# Patient Record
Sex: MALE | Race: BLACK OR AFRICAN AMERICAN | HISPANIC OR LATINO | URBAN - METROPOLITAN AREA
[De-identification: names, ages, dates, MRNs, and addresses within clinical notes are randomized per-mention and may not be internally consistent; named-entity substitution may affect disease eponyms.]

---

## 2018-12-31 ENCOUNTER — EMERGENCY (EMERGENCY)
Facility: HOSPITAL | Age: 83
LOS: 0 days | Discharge: HOME | End: 2018-12-31
Attending: EMERGENCY MEDICINE
Payer: MEDICARE

## 2018-12-31 VITALS
DIASTOLIC BLOOD PRESSURE: 84 MMHG | RESPIRATION RATE: 15 BRPM | OXYGEN SATURATION: 96 % | HEIGHT: 70 IN | SYSTOLIC BLOOD PRESSURE: 129 MMHG | WEIGHT: 240.08 LBS | TEMPERATURE: 98 F | HEART RATE: 72 BPM

## 2018-12-31 VITALS
HEART RATE: 74 BPM | RESPIRATION RATE: 16 BRPM | DIASTOLIC BLOOD PRESSURE: 88 MMHG | SYSTOLIC BLOOD PRESSURE: 147 MMHG | TEMPERATURE: 98 F | OXYGEN SATURATION: 97 %
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
## 2018-12-31 DIAGNOSIS — T78.3XXA ANGIONEUROTIC EDEMA, INITIAL ENCOUNTER: ICD-10-CM

## 2018-12-31 DIAGNOSIS — T78.40XA ALLERGY, UNSPECIFIED, INITIAL ENCOUNTER: ICD-10-CM
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
## 2018-12-31 DIAGNOSIS — I10 ESSENTIAL (PRIMARY) HYPERTENSION: ICD-10-CM

## 2018-12-31 LAB
ALBUMIN SERPL ELPH-MCNC: 3.6 G/DL — SIGNIFICANT CHANGE UP (ref 3.3–5)
ALP SERPL-CCNC: 72 U/L — SIGNIFICANT CHANGE UP (ref 40–120)
ALT FLD-CCNC: 50 U/L — SIGNIFICANT CHANGE UP (ref 12–78)
ANION GAP SERPL CALC-SCNC: 7 MMOL/L — SIGNIFICANT CHANGE UP (ref 5–17)
APTT BLD: 29.8 SEC — SIGNIFICANT CHANGE UP (ref 27.5–36.3)
AST SERPL-CCNC: 59 U/L — HIGH (ref 15–37)
BASOPHILS # BLD AUTO: 0.02 K/UL — SIGNIFICANT CHANGE UP (ref 0–0.2)
BASOPHILS NFR BLD AUTO: 0.3 % — SIGNIFICANT CHANGE UP (ref 0–2)
BILIRUB SERPL-MCNC: 1.4 MG/DL — HIGH (ref 0.2–1.2)
BLD GP AB SCN SERPL QL: SIGNIFICANT CHANGE UP
BUN SERPL-MCNC: 24 MG/DL — HIGH (ref 7–23)
CALCIUM SERPL-MCNC: 9.1 MG/DL — SIGNIFICANT CHANGE UP (ref 8.5–10.1)
CHLORIDE SERPL-SCNC: 102 MMOL/L — SIGNIFICANT CHANGE UP (ref 96–108)
CO2 SERPL-SCNC: 31 MMOL/L — SIGNIFICANT CHANGE UP (ref 22–31)
CREAT SERPL-MCNC: 1.62 MG/DL — HIGH (ref 0.5–1.3)
EOSINOPHIL # BLD AUTO: 0.3 K/UL — SIGNIFICANT CHANGE UP (ref 0–0.5)
EOSINOPHIL NFR BLD AUTO: 4.5 % — SIGNIFICANT CHANGE UP (ref 0–6)
GLUCOSE SERPL-MCNC: 104 MG/DL — HIGH (ref 70–99)
HCT VFR BLD CALC: 48.5 % — SIGNIFICANT CHANGE UP (ref 39–50)
HGB BLD-MCNC: 16.1 G/DL — SIGNIFICANT CHANGE UP (ref 13–17)
IMM GRANULOCYTES NFR BLD AUTO: 0.1 % — SIGNIFICANT CHANGE UP (ref 0–1.5)
INR BLD: 1.14 RATIO — SIGNIFICANT CHANGE UP (ref 0.88–1.16)
LYMPHOCYTES # BLD AUTO: 1.82 K/UL — SIGNIFICANT CHANGE UP (ref 1–3.3)
LYMPHOCYTES # BLD AUTO: 27.1 % — SIGNIFICANT CHANGE UP (ref 13–44)
MCHC RBC-ENTMCNC: 28.6 PG — SIGNIFICANT CHANGE UP (ref 27–34)
MCHC RBC-ENTMCNC: 33.2 GM/DL — SIGNIFICANT CHANGE UP (ref 32–36)
MCV RBC AUTO: 86.1 FL — SIGNIFICANT CHANGE UP (ref 80–100)
MONOCYTES # BLD AUTO: 0.56 K/UL — SIGNIFICANT CHANGE UP (ref 0–0.9)
MONOCYTES NFR BLD AUTO: 8.3 % — SIGNIFICANT CHANGE UP (ref 2–14)
NEUTROPHILS # BLD AUTO: 4.01 K/UL — SIGNIFICANT CHANGE UP (ref 1.8–7.4)
NEUTROPHILS NFR BLD AUTO: 59.7 % — SIGNIFICANT CHANGE UP (ref 43–77)
NRBC # BLD: 0 /100 WBCS — SIGNIFICANT CHANGE UP (ref 0–0)
PLATELET # BLD AUTO: 166 K/UL — SIGNIFICANT CHANGE UP (ref 150–400)
POTASSIUM SERPL-MCNC: 3.8 MMOL/L — SIGNIFICANT CHANGE UP (ref 3.5–5.3)
POTASSIUM SERPL-SCNC: 3.8 MMOL/L — SIGNIFICANT CHANGE UP (ref 3.5–5.3)
PROT SERPL-MCNC: 7.9 GM/DL — SIGNIFICANT CHANGE UP (ref 6–8.3)
PROTHROM AB SERPL-ACNC: 12.8 SEC — SIGNIFICANT CHANGE UP (ref 10–12.9)
RBC # BLD: 5.63 M/UL — SIGNIFICANT CHANGE UP (ref 4.2–5.8)
RBC # FLD: 15.3 % — HIGH (ref 10.3–14.5)
SODIUM SERPL-SCNC: 140 MMOL/L — SIGNIFICANT CHANGE UP (ref 135–145)
WBC # BLD: 6.72 K/UL — SIGNIFICANT CHANGE UP (ref 3.8–10.5)
WBC # FLD AUTO: 6.72 K/UL — SIGNIFICANT CHANGE UP (ref 3.8–10.5)

## 2018-12-31 PROCEDURE — 99284 EMERGENCY DEPT VISIT MOD MDM: CPT

## 2018-12-31 RX ORDER — ALBUTEROL 90 UG/1
2 AEROSOL, METERED ORAL
Qty: 0 | Refills: 0 | COMMUNITY

## 2018-12-31 RX ORDER — FAMOTIDINE 10 MG/ML
20 INJECTION INTRAVENOUS ONCE
Qty: 0 | Refills: 0 | Status: COMPLETED | OUTPATIENT
Start: 2018-12-31 | End: 2018-12-31

## 2018-12-31 RX ORDER — DIPHENHYDRAMINE HCL 50 MG
50 CAPSULE ORAL ONCE
Qty: 0 | Refills: 0 | Status: COMPLETED | OUTPATIENT
Start: 2018-12-31 | End: 2018-12-31

## 2018-12-31 RX ADMIN — Medication 125 MILLIGRAM(S): at 15:51

## 2018-12-31 RX ADMIN — FAMOTIDINE 20 MILLIGRAM(S): 10 INJECTION INTRAVENOUS at 15:43

## 2018-12-31 RX ADMIN — Medication 50 MILLIGRAM(S): at 15:40

## 2018-12-31 NOTE — ED PROVIDER NOTE - MEDICAL DECISION MAKING DETAILS
labs, pepcid, benadryl, solumedrol, continue close monitoring. given symptoms have last since am and not worsening will continue monitoring.

## 2018-12-31 NOTE — ED ADULT NURSE NOTE - OBJECTIVE STATEMENT
83 year old male to the ed with family for evaluation of tongue swelling onset about 6:30 am. He had neck stiffness and pain so he took Aleve and had grapes. He has had both in the past no reaction. He felt his tongue swelling. He took Benadryl and it went down. but the swelling is still present.

## 2018-12-31 NOTE — ED PROVIDER NOTE - PHYSICAL EXAMINATION
Gen: Alert, Well appearing. NAD    Head: NC, AT, PERRL, EOMI, normal lids/conjunctiva   ENT: Bilateral TM WNL, normal hearing, patent oropharynx without erythema/exudate, uvula midline and no edematous, + no lip edema, no trismus. mainintaing secreations and no distress  Neck: supple, no tenderness/meningismus/JVD   Pulm: Bilateral clear BS, normal resp effort, no wheeze/stridor/retractions  CV: RRR, no M/R/G, +dist pulses   Abd: soft, NT/ND, +BS, no guarding/rebound tenderness  Mskel: no edema/erythema/cyanosis   Skin: no rash   Neuro: AAOx3, no sensory/motor deficits, CN 2-12 intact

## 2018-12-31 NOTE — ED PROVIDER NOTE - NSFOLLOWUPINSTRUCTIONS_ED_ALL_ED_FT
Surgery still not set up will repopulated tomorrow to check tomorrow   Take steroids as prescribed. Stop lisinopril and follow up closely with your doctor to switch blood pressure medication. Return for any swelling/difficulty breathing/swallowing.

## 2018-12-31 NOTE — ED PROVIDER NOTE - PROGRESS NOTE DETAILS
tongue swelling improved but still large. pt refuse to be admitted, will continue monitoring Hagan: tongue swelling much better per family/day doctor, pt breathing comfortable, can visualize posterior pharynx fairly easily. instructed to stop lisinopril and f/u closely with primary doctor for different bp med. return precautions, short course steroids/benadryl as needed. stable for d/c.

## 2018-12-31 NOTE — ED ADULT NURSE NOTE - CHPI ED NUR SYMPTOMS NEG
no congestion/no difficulty breathing/no nausea/no rash/no shortness of breath/no throat itching/no vomiting/no wheezing

## 2018-12-31 NOTE — ED ADULT NURSE NOTE - CHIEF COMPLAINT QUOTE
stiff neck x 4 days, took aleve x2 tablets and then ate some grapes, now c/o swollen tongue and difficulty swallowing and speaking. Took benadryl 2 tablets at 0930

## 2018-12-31 NOTE — ED PROVIDER NOTE - OBJECTIVE STATEMENT
84yo male with pmh HTN on lisinopril, gout presents with tongue edema since 6am. Took benadryl 50mg and states slightly improved. Pt reports it happened ~ 15 min after taking aleve and eating grapes, both of which he's had before. Pt has been having some mild neck pain past few days. no sob. no lip swelling.    ROS: No fever/chills. No photophobia/eye pain/changes in vision, No ear pain/sore throat/dysphagia, No chest pain/palpitations. No SOB/cough/stridor. No abdominal pain, N/V/D, no black/bloody bm. No dysuria/frequency/discharge, No headache. No Dizziness.  No rash.  No numbness/tingling/weakness. 82yo male with pmh HTN on lisinopril, gout presents with tongue edema since 6am. Took benadryl 50mg and states slightly improved. Pt reports it happened ~ 15 min after taking aleve and eating grapes, both of which he's had before. Pt has been having some mild neck pain past few days. no sob. no lip swelling. states this has happened once before    ROS: No fever/chills. No photophobia/eye pain/changes in vision, No ear pain/sore throat/dysphagia, No chest pain/palpitations. No SOB/cough/stridor. No abdominal pain, N/V/D, no black/bloody bm. No dysuria/frequency/discharge, No headache. No Dizziness.  No rash.  No numbness/tingling/weakness.

## 2020-11-08 NOTE — ED ADULT TRIAGE NOTE - BMI (KG/M2)
Patient states she's been dealing with gout to both feet. Radha Del Rosario Wednesday night when getting up from the toilet, landing on both knees. C/o pain to feet, knees and low back.
34.4

## 2023-08-29 DIAGNOSIS — M10.9 GOUT, UNSPECIFIED: ICD-10-CM

## 2023-08-29 DIAGNOSIS — E78.49 OTHER HYPERLIPIDEMIA: ICD-10-CM

## 2023-08-29 DIAGNOSIS — R07.89 OTHER CHEST PAIN: ICD-10-CM

## 2023-08-29 DIAGNOSIS — Z87.891 PERSONAL HISTORY OF NICOTINE DEPENDENCE: ICD-10-CM

## 2023-08-29 DIAGNOSIS — Z86.73 PERSONAL HISTORY OF TRANSIENT ISCHEMIC ATTACK (TIA), AND CEREBRAL INFARCTION W/OUT RESIDUAL DEFICITS: ICD-10-CM

## 2023-08-29 PROBLEM — Z00.00 ENCOUNTER FOR PREVENTIVE HEALTH EXAMINATION: Status: ACTIVE | Noted: 2023-08-29

## 2023-08-29 RX ORDER — TAMSULOSIN HYDROCHLORIDE 0.4 MG/1
0.4 CAPSULE ORAL
Qty: 90 | Refills: 3 | Status: ACTIVE | COMMUNITY

## 2023-08-29 RX ORDER — MULTIVITAMIN
TABLET ORAL
Refills: 0 | Status: ACTIVE | COMMUNITY

## 2023-08-29 RX ORDER — ASPIRIN ENTERIC COATED TABLETS 81 MG 81 MG/1
81 TABLET, DELAYED RELEASE ORAL DAILY
Refills: 0 | Status: ACTIVE | COMMUNITY

## 2023-08-29 RX ORDER — ALLOPURINOL 100 MG/1
100 TABLET ORAL DAILY
Refills: 0 | Status: ACTIVE | COMMUNITY

## 2023-08-29 RX ORDER — MAG HYDROX/ALUMINUM HYD/SIMETH 400-400-40
SUSPENSION, ORAL (FINAL DOSE FORM) ORAL
Refills: 0 | Status: ACTIVE | COMMUNITY

## 2023-08-29 RX ORDER — SIMVASTATIN 10 MG/1
10 TABLET, FILM COATED ORAL DAILY
Refills: 0 | Status: ACTIVE | COMMUNITY

## 2023-08-31 ENCOUNTER — NON-APPOINTMENT (OUTPATIENT)
Age: 88
End: 2023-08-31

## 2023-08-31 ENCOUNTER — APPOINTMENT (OUTPATIENT)
Dept: CARDIOLOGY | Facility: CLINIC | Age: 88
End: 2023-08-31
Payer: MEDICARE

## 2023-08-31 VITALS
DIASTOLIC BLOOD PRESSURE: 113 MMHG | BODY MASS INDEX: 34.16 KG/M2 | HEART RATE: 114 BPM | SYSTOLIC BLOOD PRESSURE: 159 MMHG | WEIGHT: 244 LBS | OXYGEN SATURATION: 94 % | HEIGHT: 71 IN

## 2023-08-31 DIAGNOSIS — R06.02 SHORTNESS OF BREATH: ICD-10-CM

## 2023-08-31 PROCEDURE — 99204 OFFICE O/P NEW MOD 45 MIN: CPT

## 2023-08-31 PROCEDURE — 93000 ELECTROCARDIOGRAM COMPLETE: CPT

## 2023-08-31 NOTE — DISCUSSION/SUMMARY
[FreeTextEntry1] : 87 year old man with history of HTN HLD and CVA (2000) who comes in for evaluation of palpitations and shortness of breath. He states the dyspnea has been going on for years but now he experiences central chest pressure. His palpitations are that his heart feels like it is pounding. #HTN- He checks his BP each morning and /70 On Amlodipine and Atenolol/Losartan #Chest pain/Dyspnea- check Pharm nuc stress test -patient walks with a cane) /TTE Continue ASA and atorva for now #FU in 3 months [EKG obtained to assist in diagnosis and management of assessed problem(s)] : EKG obtained to assist in diagnosis and management of assessed problem(s)

## 2023-08-31 NOTE — HISTORY OF PRESENT ILLNESS
[FreeTextEntry1] : 87 year old man with history of HTN HLD and CVA (2000) who comes in for evaluation of palpitations and shortness of breath. He states the dyspnea has been going on for years but now he experiences central chest pressure. His palpitations are that his heart feels like it is pounding. His EKG shows a prolonged 1AVB

## 2023-09-26 ENCOUNTER — OUTPATIENT (OUTPATIENT)
Dept: OUTPATIENT SERVICES | Facility: HOSPITAL | Age: 88
LOS: 1 days | End: 2023-09-26
Payer: MEDICARE

## 2023-09-26 ENCOUNTER — APPOINTMENT (OUTPATIENT)
Dept: CV DIAGNOSITCS | Facility: HOSPITAL | Age: 88
End: 2023-09-26

## 2023-09-26 ENCOUNTER — APPOINTMENT (OUTPATIENT)
Dept: CV DIAGNOSTICS | Facility: HOSPITAL | Age: 88
End: 2023-09-26

## 2023-09-26 DIAGNOSIS — I10 ESSENTIAL (PRIMARY) HYPERTENSION: ICD-10-CM

## 2023-09-26 DIAGNOSIS — R06.02 SHORTNESS OF BREATH: ICD-10-CM

## 2023-09-26 DIAGNOSIS — R07.89 OTHER CHEST PAIN: ICD-10-CM

## 2023-09-26 DIAGNOSIS — R00.2 PALPITATIONS: ICD-10-CM

## 2023-09-26 PROCEDURE — 93306 TTE W/DOPPLER COMPLETE: CPT | Mod: 26

## 2023-09-26 PROCEDURE — 78452 HT MUSCLE IMAGE SPECT MULT: CPT | Mod: 26,MH

## 2023-12-14 NOTE — ED ADULT NURSE NOTE - NSIMPLEMENTINTERV_GEN_ALL_ED
No Implemented All Universal Safety Interventions:  [place] to call system. Call bell, personal items and telephone within reach. Instruct patient to call for assistance. Room bathroom lighting operational. Non-slip footwear when patient is off stretcher. Physically safe environment: no spills, clutter or unnecessary equipment. Stretcher in lowest position, wheels locked, appropriate side rails in place. 2 = A lot of assistance

## 2024-04-12 ENCOUNTER — APPOINTMENT (OUTPATIENT)
Dept: CARDIOLOGY | Facility: CLINIC | Age: 89
End: 2024-04-12
Payer: MEDICARE

## 2024-04-12 ENCOUNTER — NON-APPOINTMENT (OUTPATIENT)
Age: 89
End: 2024-04-12

## 2024-04-12 VITALS — SYSTOLIC BLOOD PRESSURE: 157 MMHG | DIASTOLIC BLOOD PRESSURE: 100 MMHG

## 2024-04-12 VITALS — SYSTOLIC BLOOD PRESSURE: 133 MMHG | DIASTOLIC BLOOD PRESSURE: 91 MMHG

## 2024-04-12 VITALS
HEIGHT: 71 IN | OXYGEN SATURATION: 97 % | DIASTOLIC BLOOD PRESSURE: 100 MMHG | WEIGHT: 250 LBS | SYSTOLIC BLOOD PRESSURE: 172 MMHG | BODY MASS INDEX: 35 KG/M2 | HEART RATE: 119 BPM

## 2024-04-12 DIAGNOSIS — I10 ESSENTIAL (PRIMARY) HYPERTENSION: ICD-10-CM

## 2024-04-12 DIAGNOSIS — R00.2 PALPITATIONS: ICD-10-CM

## 2024-04-12 PROCEDURE — 93000 ELECTROCARDIOGRAM COMPLETE: CPT

## 2024-04-12 PROCEDURE — 99214 OFFICE O/P EST MOD 30 MIN: CPT

## 2024-04-12 RX ORDER — LOSARTAN POTASSIUM 100 MG/1
100 TABLET, FILM COATED ORAL DAILY
Qty: 90 | Refills: 3 | Status: ACTIVE | COMMUNITY

## 2024-04-12 RX ORDER — AMLODIPINE BESYLATE 5 MG/1
5 TABLET ORAL DAILY
Qty: 90 | Refills: 3 | Status: ACTIVE | COMMUNITY

## 2024-04-12 NOTE — HISTORY OF PRESENT ILLNESS
[FreeTextEntry1] : 88 year old man with history of HTN HLD and CVA (2000) who comes in for evaluation of palpitations and shortness of breath. He states the dyspnea has been going on for years but now he experiences central chest pressure.  His EKG shows a prolonged 1AVB #HTN- He checks his BP each morning and /70 On Amlodipine and Losartan #Chest pain/Dyspnea-Had nuclear stress and echo that were normal     1. The left ventricular cavity is normal size. Left ventricular systolic function is normal with an ejection fraction visually estimated at 55 to 60 %.  2. Left ventricular endocardium is not well visualized; however, the left ventricular systolic function appears grossly normal.  3. There is normal LV mass and concentric remodeling.  4. Right ventricular cavity is normal in size and probably normal systolic function.  5. Trace mitral regurgitation.  1. Myocardial Perfusion: Abnormal.  2. Qualitative Perfusion:     - small-sized, mild defect(s) in the basal inferior wall that is fixed suggestive of an infarct.  3. Baseline electrocardiogram: normal sinus rhythm at a rate of 111 bpm with first degree AV block and no sigificant ST abnomalities.  4. Post Stress EF is likey underestimated.

## 2024-04-12 NOTE — DISCUSSION/SUMMARY
[FreeTextEntry1] : 88 year old man with history of HTN HLD and CVA (2000) who comes in for evaluation of palpitations and shortness of breath. He states the dyspnea has been going on for years but now he experiences central chest pressure.  His EKG shows a prolonged 1AVB #HTN- He checks his BP each morning and /70 On Amlodipine and Losartan #Chest pain/Dyspnea-Had nuclear stress and echo that were normal #FU in 3 months [EKG obtained to assist in diagnosis and management of assessed problem(s)] : EKG obtained to assist in diagnosis and management of assessed problem(s)

## 2024-10-27 NOTE — ED ADULT TRIAGE NOTE - CHIEF COMPLAINT QUOTE
Female
stiff neck x 4 days, took aleve x2 tablets and then ate some grapes, now c/o swollen tongue and difficulty swallowing and speaking. Took benadryl 2 tablets at 0930

## 2024-10-28 ENCOUNTER — NON-APPOINTMENT (OUTPATIENT)
Age: 89
End: 2024-10-28

## 2024-10-28 ENCOUNTER — APPOINTMENT (OUTPATIENT)
Dept: CARDIOLOGY | Facility: CLINIC | Age: 89
End: 2024-10-28
Payer: MEDICARE

## 2024-10-28 VITALS
OXYGEN SATURATION: 97 % | SYSTOLIC BLOOD PRESSURE: 143 MMHG | DIASTOLIC BLOOD PRESSURE: 83 MMHG | HEIGHT: 71 IN | WEIGHT: 250 LBS | HEART RATE: 100 BPM | BODY MASS INDEX: 35 KG/M2

## 2024-10-28 DIAGNOSIS — I10 ESSENTIAL (PRIMARY) HYPERTENSION: ICD-10-CM

## 2024-10-28 PROCEDURE — 99214 OFFICE O/P EST MOD 30 MIN: CPT

## 2024-10-28 PROCEDURE — G2211 COMPLEX E/M VISIT ADD ON: CPT

## 2024-10-28 PROCEDURE — 93000 ELECTROCARDIOGRAM COMPLETE: CPT

## 2025-04-28 ENCOUNTER — APPOINTMENT (OUTPATIENT)
Dept: CARDIOLOGY | Facility: CLINIC | Age: 89
End: 2025-04-28
Payer: MEDICARE

## 2025-04-28 ENCOUNTER — NON-APPOINTMENT (OUTPATIENT)
Age: 89
End: 2025-04-28

## 2025-04-28 VITALS
OXYGEN SATURATION: 98 % | HEIGHT: 71 IN | SYSTOLIC BLOOD PRESSURE: 168 MMHG | DIASTOLIC BLOOD PRESSURE: 100 MMHG | HEART RATE: 91 BPM

## 2025-04-28 VITALS — DIASTOLIC BLOOD PRESSURE: 94 MMHG | SYSTOLIC BLOOD PRESSURE: 166 MMHG

## 2025-04-28 DIAGNOSIS — R00.2 PALPITATIONS: ICD-10-CM

## 2025-04-28 DIAGNOSIS — I10 ESSENTIAL (PRIMARY) HYPERTENSION: ICD-10-CM

## 2025-04-28 PROCEDURE — 99214 OFFICE O/P EST MOD 30 MIN: CPT

## 2025-04-28 PROCEDURE — G2211 COMPLEX E/M VISIT ADD ON: CPT

## 2025-04-28 PROCEDURE — 93000 ELECTROCARDIOGRAM COMPLETE: CPT
